# Patient Record
Sex: FEMALE | Race: WHITE | NOT HISPANIC OR LATINO | URBAN - METROPOLITAN AREA
[De-identification: names, ages, dates, MRNs, and addresses within clinical notes are randomized per-mention and may not be internally consistent; named-entity substitution may affect disease eponyms.]

---

## 2020-12-29 ENCOUNTER — EMERGENCY (EMERGENCY)
Facility: HOSPITAL | Age: 70
LOS: 1 days | Discharge: HOME | End: 2020-12-29
Attending: EMERGENCY MEDICINE
Payer: MEDICARE

## 2020-12-29 VITALS
HEART RATE: 70 BPM | RESPIRATION RATE: 18 BRPM | SYSTOLIC BLOOD PRESSURE: 172 MMHG | WEIGHT: 143.96 LBS | DIASTOLIC BLOOD PRESSURE: 77 MMHG | HEIGHT: 60 IN | OXYGEN SATURATION: 100 % | TEMPERATURE: 98 F

## 2020-12-29 VITALS
OXYGEN SATURATION: 100 % | RESPIRATION RATE: 18 BRPM | HEART RATE: 63 BPM | SYSTOLIC BLOOD PRESSURE: 160 MMHG | TEMPERATURE: 98 F | DIASTOLIC BLOOD PRESSURE: 78 MMHG

## 2020-12-29 PROCEDURE — 99283 EMERGENCY DEPT VISIT LOW MDM: CPT

## 2020-12-29 PROCEDURE — 99282 EMERGENCY DEPT VISIT SF MDM: CPT

## 2020-12-29 RX ORDER — NEBIVOLOL HYDROCHLORIDE 5 MG/1
1 TABLET ORAL
Qty: 0 | Refills: 0 | DISCHARGE

## 2020-12-29 RX ORDER — DIPHENHYDRAMINE HCL 50 MG
25 CAPSULE ORAL ONCE
Refills: 0 | Status: DISCONTINUED | OUTPATIENT
Start: 2020-12-29 | End: 2020-12-29

## 2020-12-29 RX ORDER — FLUTICASONE FUROATE, UMECLIDINIUM BROMIDE AND VILANTEROL TRIFENATATE 200; 62.5; 25 UG/1; UG/1; UG/1
1 POWDER RESPIRATORY (INHALATION)
Qty: 0 | Refills: 0 | DISCHARGE

## 2020-12-29 RX ORDER — SERTRALINE 25 MG/1
1 TABLET, FILM COATED ORAL
Qty: 0 | Refills: 0 | DISCHARGE

## 2020-12-29 RX ORDER — DIPHENHYDRAMINE HCL 50 MG
25 CAPSULE ORAL ONCE
Refills: 0 | Status: COMPLETED | OUTPATIENT
Start: 2020-12-29 | End: 2020-12-29

## 2020-12-29 RX ADMIN — Medication 25 MILLIGRAM(S): at 18:47

## 2020-12-29 NOTE — ED PROVIDER NOTE - CARE PLAN
Principal Discharge DX:	Itching  Assessment and plan of treatment:	1. Stay hydrated. avoid harsh soaps/detergents. avoid scratching.   2. Continue current home medications. Take Benadryl 25mg every 6hrs for itching as needed- don't drive after, makes drowsy.   3. Apply Aquaphor to hands - can apply at night and put gloves on hands to help from drying.   4. Follow up with your PCP in 1-2 days. Follow up with Dermatology #[phone] in 3-5 days. (Bring Printed results to your doctor visit)  5. Return if symptoms worsen, fever, weakness, spreading redness and all other concerns

## 2020-12-29 NOTE — ED ADULT NURSE NOTE - OBJECTIVE STATEMENT
C/O itchy  hand on palm of the hand  X 6 to 8 weeks   no rash noted  Pt denies  fever chills CP sob NV/D fever chills + itching no swelling noted Pt is not in pain or distress  . No other place rash is noted  Pt S/B DR Jazmin Valle . Medicated as per order

## 2020-12-29 NOTE — ED PROVIDER NOTE - PHYSICAL EXAMINATION
b/l hand with mild excoriations, no rash noted. 2-3 small papules noted at L forearm, with some excoriations, superficial. no evidence of erythema, swelling. non-tender. FROM b/l hands and wrists. no sign of infection.

## 2020-12-29 NOTE — ED PROVIDER NOTE - PLAN OF CARE
1. Stay hydrated. avoid harsh soaps/detergents. avoid scratching.   2. Continue current home medications. Take Benadryl 25mg every 6hrs for itching as needed- don't drive after, makes drowsy.   3. Apply Aquaphor to hands - can apply at night and put gloves on hands to help from drying.   4. Follow up with your PCP in 1-2 days. Follow up with Dermatology #[phone] in 3-5 days. (Bring Printed results to your doctor visit)  5. Return if symptoms worsen, fever, weakness, spreading redness and all other concerns

## 2020-12-29 NOTE — ED PROVIDER NOTE - PROGRESS NOTE DETAILS
Patient is reassessed, states feeling much better at this time. Discussed follow up and return precautions. RGUJRAL

## 2020-12-29 NOTE — ED PROVIDER NOTE - ATTENDING CONTRIBUTION TO CARE
FRANKIEUJRAL 69 yo f hx COPD and anxiety presents with itching in both hands for weeks that increased overnight. Patient RGUJRAL 69 yo f hx COPD and anxiety presents with itching in both hands for weeks that increased overnight. Patient denies any rash, new soap or medications. Itching is only localized to hands and wrist. Denies any difficulty swallowing or breathing. Denies any HA, neck/chest/abd pain. No new diets or vegetarian diet. Nml stool and urination.   On exam, Patient is awake, alert and oriented x 3.  Patient is well appearing and in no acute distress.  NCAT, PERRL, EOMI. Sclera clear  Neck is supple, No LAD.  Lungs are CTA B/L,+S1S2 no murmurs,  Abdomen:Soft nd/nt+bs no rebound or guarding.  Extremity no edema or calf tender.  B/L hands no swelling or rash. + excoriated skin due to itching and dry hands.   Skin with no rash.  Neuro CN3-12 intact. Strength 5/5 in upper and lower extremities. Nml Sensation.Gait normal.   Patient non smoker now. Pt has tried topical cortisone w no relief. Benadryl and re eval.  DDx dry hands could result in itching. Discussed hydration to hands.

## 2020-12-29 NOTE — ED PROVIDER NOTE - CROS ED SKIN ALL NEG
From: Pooja Lockhart  To: Aixa Cueva NP  Sent: 7/5/2019 9:03 AM CDT  Subject: After-Visit Question    In mid June I visited you in your office. You diagnosed me with a sinus infection and ear infections. I was placed on Doxycycline for 10 days. The problem is I am still having sporadic ear pain on the left side. I also feel some sinus pressure there, this is continuous. I am not sure if I should make an appointment and come in or if I just need more medication. I had felt I should wait a while in contacting you feeling it took time for the antibiotic to do its job but it seems the ear pain is getting closer together and I do not want to get back to where I had been before my visit. Thank you for your help!   - - -

## 2020-12-29 NOTE — ED PROVIDER NOTE - PATIENT PORTAL LINK FT
You can access the FollowMyHealth Patient Portal offered by [facility] by registering at the following website: [email or web address]. By joining [employer]’s FollowMyHealth portal, you will also be able to view your health information using other applications (apps) compatible with our system.

## 2020-12-29 NOTE — ED ADULT NURSE NOTE - NSIMPLEMENTINTERV_GEN_ALL_ED
Implemented All Universal Safety Interventions:  [place] to call system. Call bell, personal items and telephone within reach. Instruct patient to call for assistance. Room bathroom lighting operational. Non-slip footwear when patient is off stretcher. Physically safe environment: no spills, clutter or unnecessary equipment. Stretcher in lowest position, wheels locked, appropriate side rails in place.

## 2020-12-29 NOTE — ED PROVIDER NOTE - NSFOLLOWUPINSTRUCTIONS_ED_ALL_ED_FT
1. Stay hydrated. avoid harsh soaps/detergents. avoid scratching.   2. Continue current home medications. Take Benadryl 25mg every 6hrs for itching as needed- don't drive after, makes drowsy.   3. Apply Aquaphor to hands - can apply at night and put gloves on hands to help from drying.   4. Follow up with your PCP in 1-2 days. Follow up with Dermatology #[phone] in 3-5 days. (Bring Printed results to your doctor visit)  5. Return if symptoms worsen, fever, weakness, spreading redness and all other concerns

## 2021-08-17 ENCOUNTER — EMERGENCY (EMERGENCY)
Facility: HOSPITAL | Age: 71
LOS: 1 days | Discharge: HOME | End: 2021-08-17
Attending: EMERGENCY MEDICINE
Payer: MEDICARE

## 2021-08-17 VITALS
SYSTOLIC BLOOD PRESSURE: 113 MMHG | HEIGHT: 60 IN | RESPIRATION RATE: 18 BRPM | WEIGHT: 143.08 LBS | TEMPERATURE: 98 F | DIASTOLIC BLOOD PRESSURE: 73 MMHG | HEART RATE: 70 BPM | OXYGEN SATURATION: 96 %

## 2021-08-17 VITALS
RESPIRATION RATE: 18 BRPM | DIASTOLIC BLOOD PRESSURE: 75 MMHG | SYSTOLIC BLOOD PRESSURE: 178 MMHG | TEMPERATURE: 98 F | HEART RATE: 68 BPM | OXYGEN SATURATION: 98 %

## 2021-08-17 PROBLEM — R00.0 TACHYCARDIA, UNSPECIFIED: Chronic | Status: ACTIVE | Noted: 2020-12-29

## 2021-08-17 PROBLEM — J44.9 CHRONIC OBSTRUCTIVE PULMONARY DISEASE, UNSPECIFIED: Chronic | Status: ACTIVE | Noted: 2020-12-29

## 2021-08-17 LAB
ALBUMIN SERPL ELPH-MCNC: 4.4 G/DL — SIGNIFICANT CHANGE UP (ref 3.3–5)
ALP SERPL-CCNC: 94 U/L — SIGNIFICANT CHANGE UP (ref 40–120)
ALT FLD-CCNC: 15 U/L — SIGNIFICANT CHANGE UP (ref 10–45)
ANION GAP SERPL CALC-SCNC: 16 MMOL/L — SIGNIFICANT CHANGE UP (ref 5–17)
APTT BLD: 27.7 SEC — SIGNIFICANT CHANGE UP (ref 27.5–35.5)
AST SERPL-CCNC: 20 U/L — SIGNIFICANT CHANGE UP (ref 10–40)
BASOPHILS # BLD AUTO: 0.06 K/UL — SIGNIFICANT CHANGE UP (ref 0–0.2)
BASOPHILS NFR BLD AUTO: 0.7 % — SIGNIFICANT CHANGE UP (ref 0–2)
BILIRUB SERPL-MCNC: 0.3 MG/DL — SIGNIFICANT CHANGE UP (ref 0.2–1.2)
BUN SERPL-MCNC: 17 MG/DL — SIGNIFICANT CHANGE UP (ref 7–23)
CALCIUM SERPL-MCNC: 9.8 MG/DL — SIGNIFICANT CHANGE UP (ref 8.4–10.5)
CHLORIDE SERPL-SCNC: 104 MMOL/L — SIGNIFICANT CHANGE UP (ref 96–108)
CO2 BLDV-SCNC: 28 MMOL/L — HIGH (ref 22–26)
CO2 SERPL-SCNC: 22 MMOL/L — SIGNIFICANT CHANGE UP (ref 22–31)
CREAT SERPL-MCNC: 0.76 MG/DL — SIGNIFICANT CHANGE UP (ref 0.5–1.3)
EOSINOPHIL # BLD AUTO: 0.03 K/UL — SIGNIFICANT CHANGE UP (ref 0–0.5)
EOSINOPHIL NFR BLD AUTO: 0.4 % — SIGNIFICANT CHANGE UP (ref 0–6)
GLUCOSE SERPL-MCNC: 167 MG/DL — HIGH (ref 70–99)
HCO3 BLDV-SCNC: 27 MMOL/L — SIGNIFICANT CHANGE UP (ref 22–29)
HCT VFR BLD CALC: 47.1 % — HIGH (ref 34.5–45)
HGB BLD-MCNC: 15.1 G/DL — SIGNIFICANT CHANGE UP (ref 11.5–15.5)
IMM GRANULOCYTES NFR BLD AUTO: 0.6 % — SIGNIFICANT CHANGE UP (ref 0–1.5)
INR BLD: 0.97 RATIO — SIGNIFICANT CHANGE UP (ref 0.88–1.16)
LYMPHOCYTES # BLD AUTO: 1.51 K/UL — SIGNIFICANT CHANGE UP (ref 1–3.3)
LYMPHOCYTES # BLD AUTO: 18.5 % — SIGNIFICANT CHANGE UP (ref 13–44)
MCHC RBC-ENTMCNC: 29.1 PG — SIGNIFICANT CHANGE UP (ref 27–34)
MCHC RBC-ENTMCNC: 32.1 GM/DL — SIGNIFICANT CHANGE UP (ref 32–36)
MCV RBC AUTO: 90.8 FL — SIGNIFICANT CHANGE UP (ref 80–100)
MONOCYTES # BLD AUTO: 0.45 K/UL — SIGNIFICANT CHANGE UP (ref 0–0.9)
MONOCYTES NFR BLD AUTO: 5.5 % — SIGNIFICANT CHANGE UP (ref 2–14)
NEUTROPHILS # BLD AUTO: 6.07 K/UL — SIGNIFICANT CHANGE UP (ref 1.8–7.4)
NEUTROPHILS NFR BLD AUTO: 74.3 % — SIGNIFICANT CHANGE UP (ref 43–77)
NRBC # BLD: 0 /100 WBCS — SIGNIFICANT CHANGE UP (ref 0–0)
PCO2 BLDV: 46 MMHG — HIGH (ref 39–42)
PH BLDV: 7.37 — SIGNIFICANT CHANGE UP (ref 7.32–7.43)
PLATELET # BLD AUTO: 291 K/UL — SIGNIFICANT CHANGE UP (ref 150–400)
PO2 BLDV: 30 MMHG — SIGNIFICANT CHANGE UP (ref 25–45)
POTASSIUM SERPL-MCNC: 4.6 MMOL/L — SIGNIFICANT CHANGE UP (ref 3.5–5.3)
POTASSIUM SERPL-SCNC: 4.6 MMOL/L — SIGNIFICANT CHANGE UP (ref 3.5–5.3)
PROT SERPL-MCNC: 7.4 G/DL — SIGNIFICANT CHANGE UP (ref 6–8.3)
PROTHROM AB SERPL-ACNC: 11.6 SEC — SIGNIFICANT CHANGE UP (ref 10.6–13.6)
RBC # BLD: 5.19 M/UL — SIGNIFICANT CHANGE UP (ref 3.8–5.2)
RBC # FLD: 12.8 % — SIGNIFICANT CHANGE UP (ref 10.3–14.5)
SAO2 % BLDV: 52.2 % — LOW (ref 67–88)
SARS-COV-2 RNA SPEC QL NAA+PROBE: SIGNIFICANT CHANGE UP
SODIUM SERPL-SCNC: 142 MMOL/L — SIGNIFICANT CHANGE UP (ref 135–145)
TROPONIN T, HIGH SENSITIVITY RESULT: <6 NG/L — SIGNIFICANT CHANGE UP (ref 0–51)
WBC # BLD: 8.17 K/UL — SIGNIFICANT CHANGE UP (ref 3.8–10.5)
WBC # FLD AUTO: 8.17 K/UL — SIGNIFICANT CHANGE UP (ref 3.8–10.5)

## 2021-08-17 PROCEDURE — 99284 EMERGENCY DEPT VISIT MOD MDM: CPT | Mod: 25

## 2021-08-17 PROCEDURE — 93010 ELECTROCARDIOGRAM REPORT: CPT

## 2021-08-17 PROCEDURE — 93005 ELECTROCARDIOGRAM TRACING: CPT

## 2021-08-17 PROCEDURE — 82962 GLUCOSE BLOOD TEST: CPT

## 2021-08-17 PROCEDURE — U0005: CPT

## 2021-08-17 PROCEDURE — 70496 CT ANGIOGRAPHY HEAD: CPT | Mod: MA

## 2021-08-17 PROCEDURE — 85610 PROTHROMBIN TIME: CPT

## 2021-08-17 PROCEDURE — 85730 THROMBOPLASTIN TIME PARTIAL: CPT

## 2021-08-17 PROCEDURE — 80053 COMPREHEN METABOLIC PANEL: CPT

## 2021-08-17 PROCEDURE — 82803 BLOOD GASES ANY COMBINATION: CPT

## 2021-08-17 PROCEDURE — 70498 CT ANGIOGRAPHY NECK: CPT | Mod: MA

## 2021-08-17 PROCEDURE — 84484 ASSAY OF TROPONIN QUANT: CPT

## 2021-08-17 PROCEDURE — 70450 CT HEAD/BRAIN W/O DYE: CPT | Mod: MA

## 2021-08-17 PROCEDURE — 70498 CT ANGIOGRAPHY NECK: CPT | Mod: 26,MA

## 2021-08-17 PROCEDURE — 99285 EMERGENCY DEPT VISIT HI MDM: CPT

## 2021-08-17 PROCEDURE — 85025 COMPLETE CBC W/AUTO DIFF WBC: CPT

## 2021-08-17 PROCEDURE — U0003: CPT

## 2021-08-17 PROCEDURE — 70496 CT ANGIOGRAPHY HEAD: CPT | Mod: 26,MA

## 2021-08-17 RX ORDER — ATORVASTATIN CALCIUM 80 MG/1
1 TABLET, FILM COATED ORAL
Qty: 21 | Refills: 0
Start: 2021-08-17 | End: 2021-09-06

## 2021-08-17 RX ORDER — CLOPIDOGREL BISULFATE 75 MG/1
300 TABLET, FILM COATED ORAL ONCE
Refills: 0 | Status: COMPLETED | OUTPATIENT
Start: 2021-08-17 | End: 2021-08-17

## 2021-08-17 RX ORDER — CLOPIDOGREL BISULFATE 75 MG/1
1 TABLET, FILM COATED ORAL
Qty: 21 | Refills: 0
Start: 2021-08-17 | End: 2021-09-06

## 2021-08-17 RX ORDER — ATORVASTATIN CALCIUM 80 MG/1
80 TABLET, FILM COATED ORAL ONCE
Refills: 0 | Status: COMPLETED | OUTPATIENT
Start: 2021-08-17 | End: 2021-08-17

## 2021-08-17 RX ORDER — ASPIRIN/CALCIUM CARB/MAGNESIUM 324 MG
81 TABLET ORAL ONCE
Refills: 0 | Status: COMPLETED | OUTPATIENT
Start: 2021-08-17 | End: 2021-08-17

## 2021-08-17 RX ADMIN — CLOPIDOGREL BISULFATE 300 MILLIGRAM(S): 75 TABLET, FILM COATED ORAL at 17:05

## 2021-08-17 RX ADMIN — Medication 81 MILLIGRAM(S): at 17:05

## 2021-08-17 RX ADMIN — ATORVASTATIN CALCIUM 80 MILLIGRAM(S): 80 TABLET, FILM COATED ORAL at 17:05

## 2021-08-17 NOTE — STROKE CODE NOTE - DISPOSITION
Other The management and treatment decisions which include alteplase and mechanical thrombectomy were discussed with stroke fellow Dr. Radha Nicolas under supervision neurovascular attending Dr. Hola Whyte./Other

## 2021-08-17 NOTE — ED PROVIDER NOTE - PROGRESS NOTE DETAILS
neuro advised pt can have rapid outpt f/u. pt offered cdu (lives alone, was dizzy, no life alert). she declines this and will have someone stay w her today/tonight and get rx / f/u w neuro. feeling better and wants to go home, reviewed case and results w pt, questions answered and pt understands, advised return precautions and care plan. Bryant Henderson DO PGY-3: receieved as a sign out - pending neuro recs - recs reviewed. Pt did not take her asa today. Does not take plavix and any statins at home. Asxs at this time. No new complains

## 2021-08-17 NOTE — ED ADULT NURSE NOTE - OBJECTIVE STATEMENT
72 y/o female presenting to ED for blurry vision, dizziness and room spinning starting when she woke up this AM at 0615. Pt reports that last night she was normal before bed at 1015pm. Pt states that the room is spinning and "going up and down" and is associated with nausea but no vomiting. Pt has tremors at baseline, with neuro appointments for f/u. Upon exam pt A&Ox3 gross neuro intact,  no difficulty speaking in complete sentences, s1s2 heart sounds heard, pulses x 4, abreu x4, abdomen soft nontender nondistended, skin intact, pt has unsteady gait on ambulation. Pt denies chest pain, sob, ha, n/v/d, abdominal pain, f/c, urinary symptoms, hematuria.

## 2021-08-17 NOTE — CONSULT NOTE ADULT - SUBJECTIVE AND OBJECTIVE BOX
HPI: 70yo RH woman h/o tachycardia, COPD, strabismus, anxiety not on antithrombotics presenting to [facility] ED as code stroke for room-spinning dizziness/dysequilibrium with LWK 11P 8/16/21. Patient reports she woke up this morning around 6:15am and when she went to reach for her phone and had her head turned, she felt acute onset room-spinning dizziness which did not resolve with position change. She had some nauseousness and dry heaves. No associated headaches, visual disturbances, dysphagia, diarrhea, dysuria, cough/cold symptoms, hearing loss or tinnitus, head or neck trauma, loss of consciousness. She has never had this happen before. Quit smoking years ago. She reports she has chronic cough and once in a while food will go "into the wrong pipe".     (Stroke only)  NIHSS: 1  MRS: 0    REVIEW OF SYSTEMS    A 10-system ROS was performed and is negative except for those items noted above and/or in the HPI.    PAST MEDICAL & SURGICAL HISTORY:  COPD (chronic obstructive pulmonary disease)    Tachycardia      FAMILY HISTORY:    SOCIAL HISTORY:   T/E/D:   Occupation:   Lives with:     MEDICATIONS (HOME):  Home Medications:  Bystolic 5 mg oral tablet: 1 tab(s) orally once a day (29 Dec 2020 18:44)  sertraline 50 mg oral tablet: 1 tab(s) orally once a day (29 Dec 2020 18:44)  Trelegy Ellipta 100 mcg-62.5 mcg-25 mcg/inh inhalation powder: 1 puff(s) inhaled once a day (29 Dec 2020 18:44)    MEDICATIONS  (STANDING):    MEDICATIONS  (PRN):    ALLERGIES/INTOLERANCES:  Allergies  No Known Allergies    VITALS & EXAMINATION:  Vital Signs Last 24 Hrs  T(C): 36.4 (17 Aug 2021 12:00), Max: 36.4 (17 Aug 2021 12:00)  T(F): 97.5 (17 Aug 2021 12:00), Max: 97.5 (17 Aug 2021 12:00)  HR: 75 (17 Aug 2021 14:21) (70 - 81)  BP: 131/52 (17 Aug 2021 14:21) (113/73 - 173/70)  BP(mean): 75 (17 Aug 2021 14:21) (75 - 75)  RR: 18 (17 Aug 2021 14:21) (18 - 18)  SpO2: 98% (17 Aug 2021 14:21) (96% - 98%)    Neurological (>12):  MS: eyes open, alert, oriented to person, place, situation, time. Normal affect. Follows all commands.    Language: Speech is mildly dysarthric (though patient reports she is at baseline), fluent with good repetition & comprehension (able to name objects___)    CNs: PERRL (R = 3mm, L = 3mm). VFF. EOM demonstrates torsional nystagmus upon right gaze and left-beating nystagmus on left gaze, V1-3 intact to LT, well developed masseter muscles b/l. No facial asymmetry b/l, full eye closure strength b/l. Hearing grossly normal (rubbing fingers) b/l. Symmetric palate elevation in midline. Gag reflex deferred. Head turning & shoulder shrug intact b/l. Tongue midline, normal movements, no atrophy.    HINTS: no corrective saccade upon head impulse test, nystagmus as above, negative test of skew  Paterson-Hallpike: negative      Motor: Normal muscle bulk & tone. No noticeable tremor. No pronator drift.              Deltoid	Biceps	Triceps	Wrist	Finger ABd	   R	5	5	5	5	5		5 	  L	5	5	5	5	5		5    	H-Flex	H-Ext	H-ABd	H-ADd	K-Flex	K-Ext	D-Flex	P-Flex  R	5	5	5	5	5	5	5	5 	   L	5	5	5	5	5	5	5	5	     Sensation: Intact to LT b/l throughout.     Cortical: Extinction on DSS (neglect): none    Coordination: intact rapid-alt movements. No dysmetria to FTN/HTS    Gait: No postural instability. Normal stance and tandem gait.     LABORATORY:  CBC                       15.1   8.17  )-----------( 291      ( 17 Aug 2021 12:18 )             47.1     Chem 08-17    142  |  104  |  17  ----------------------------<  167<H>  4.6   |  22  |  0.76    Ca    9.8      17 Aug 2021 12:18    TPro  7.4  /  Alb  4.4  /  TBili  0.3  /  DBili  x   /  AST  20  /  ALT  15  /  AlkPhos  94  08-17    LFTs LIVER FUNCTIONS - ( 17 Aug 2021 12:18 )  Alb: 4.4 g/dL / Pro: 7.4 g/dL / ALK PHOS: 94 U/L / ALT: 15 U/L / AST: 20 U/L / GGT: x           Coagulopathy PT/INR - ( 17 Aug 2021 12:18 )   PT: 11.6 sec;   INR: 0.97 ratio         PTT - ( 17 Aug 2021 12:18 )  PTT:27.7 sec    STUDIES & IMAGING:  Studies (EKG, EEG, EMG, etc):     Radiology (XR, CT, MR, U/S, TTE/ROXIE):     CT Head noncon, CT Angio Head & Neck w/ IV Cont (08.17.21 @ 12:24)     IMPRESSION:    HEAD CT: No acute intracranial hemorrhage or acute territorial infarction.    If symptoms persist consider follow up head CT or MRI, MRA  if no contraindication.    CTA COW:  Patent intracranial circulation without flow limiting stenosis or large vessel occlusion.    CTA NECK: Patent, ECAs, ICAs, no  hemodynamically significant stenosis at  ICA origins by NASCET criteria.  Bilateral vertebral arteries are patent without flow limiting stenosis.

## 2021-08-17 NOTE — ED PROVIDER NOTE - CARE PROVIDER_API CALL
Hola Whyte)  Neurology; Vascular Neurology  [address], [address]  [place] [address]  Phone: [phone]  Fax: [phone]  Follow Up Time: 4-6 Days

## 2021-08-17 NOTE — CONSULT NOTE ADULT - ASSESSMENT
Assessment: 72yo RH woman h/o tachycardia, COPD, strabismus, anxiety not on antithrombotics presenting to [facility] ED as code stroke for room-spinning dizziness/dysequilibrium with LWK 11P 21. Neurological examination demonstrates right torsional nystagmus and left-beating nystagmus. CTH/CTA negative. Patient out of window for tpa and symptoms resolved. No radiographic evidence of LVO so not a candidate for mechanical thrombectomy.    Impression: acute vestibular syndrome with negative karla hallpike and direction changing nystagmus 2/2 posterior fossa infarct 2/2 likely     Recommendations:  [ ] no further neurological workup inpatient  [ ] as outpatient may get MRI head noncon  [ ] ABCD2 5 - moderate risk of stroke  [ ] aspirin 81mg daily indefinitely  [ ] load with plavix 300mg x 1  [ ] plavix 75mg daily for 21 days per CHANCE trial  [ ] atorvastatin 80mg daily to titrate for LDL < 70  [ ] a1c, lipid profile  [ ] upon discharge pt may follow up with Dr. Whyte as outpatient  [phone]    case d/w Stroke Fellow Dr. Radha Nicolas and Neurovascular Attending Dr. Hola Whyte

## 2021-08-17 NOTE — ED PROVIDER NOTE - PHYSICAL EXAMINATION
Physical Exam:  Gen: NAD, non-toxic appearing, demonstrates difficulty ambulating to CT scan   Head: NCAT  HEENT: EOMI, PEERL, normal conjunctiva, tongue midline, oral mucosa moist  Lung: CTAB, no respiratory distress, no wheezes/rhonchi/rales B/L, speaking in full sentences  CV: RRR, no murmurs, rubs or gallops  Abd: soft, NT, ND  MSK: no visible deformities, ROM normal in UE/LE, no back pain  Neuro: L amblyopia, CN2-12 grossly intact, A&Ox4, MS +5/5 in UE and LE BL, finger to nose smooth and rapid, gross sensation intact in UE and LE BL, ambulates , negative rhomberg, negative pronator drift  Skin: Warm, well perfused, no rash, no leg swelling  Psych: normal affect, calm  Shanique Bhardwaj D.O. -Resident

## 2021-08-17 NOTE — ED PROVIDER NOTE - NS ED ROS FT
CONSTITUTIONAL: No fevers, no chills, + dizziness  EYES: no acute visual changes, no eye pain  EARS: no ear drainage, no ear pain, no change in hearing  NOSE: no nasal congestion  MOUTH/THROAT: no sore throat  CV: No chest pain, no palpitations  RESP: No SOB, no cough  GI: +nausea, no v/d, no abd pain  : no dysuria, no hematuria, no flank pain  MSK: no back pain, no extremity pain  SKIN: no rashes  NEURO: no headache, no focal weakness, no decreased sensation/paresthesias   PSYCHIATRIC: no known mental health issues

## 2021-08-17 NOTE — ED PROVIDER NOTE - PATIENT PORTAL LINK FT
You can access the FollowMyHealth Patient Portal offered by [facility] by registering at the following website: [email or web address]. By joining [employer]’s FollowMyHealth portal, you will also be able to view your health information using other applications (apps) compatible with our system.

## 2021-08-17 NOTE — ED PROVIDER NOTE - ATTENDING CONTRIBUTION TO CARE
dizziness, last nl last night. unable to walk. code stroke at triage  poor finger to nose NIHSS - see code stroke note  ct/cta/labs

## 2021-08-17 NOTE — ED ADULT NURSE NOTE - CHPI ED NUR SYMPTOMS NEG
no change in level of consciousness/no confusion/no fever/no loss of consciousness/no numbness/no vomiting/no weakness

## 2021-08-17 NOTE — ED PROVIDER NOTE - OBJECTIVE STATEMENT
71y F w/ PMHx pre-diabetes, COPD presenting with dizziness and difficulty walking since waking up this morning at 6:15am. States she went to bed at 11pm at normal state of health. When she awoke this morning states the room was moving "up and down" and states she felt nauseous and was dry heaving. States she was having trouble walking this morning without syncope. States these symptoms have not happened in the past. Endorses en route to ED her dizziness has subsided but still endorsing severe nausea. Patient denies recent fever, chills, chest pain, tinnitus, sob, abd pain, dysuria, hematuria, rash, focal weakness or numbness. States she takes a daily ASA 81mg.

## 2021-08-17 NOTE — ED PROVIDER NOTE - NSFOLLOWUPINSTRUCTIONS_ED_ALL_ED_FT
You were seen for dizziness and difficulty walking. You're at moderate risk of having a stroke. Please take aspirin, plavix and atorvastatin as discussed. Follow up with your primary care physician for refill of your medications and continue care including sending hemoglobin A1c and lipid panel.     Follow up with Dr. Whyte as outpatient - call at  to schedule an appointment within 1 week.     If you start developing chest pain, shortness of breath, facial drool, slurrying of your speech, change in strength or sensation in your extremities or any other concerning symptoms, please seek medical assistance right away.     Your results are attached with your discharge instructions, please review them with your primary care physician. If there is a result pending, you will receive a call if test is positive.    A presumptive diagnosis is made today, but further evaluation may be required by your primary care doctor and/or specialist for a definitive diagnosis. Therefore, follow up as directed and if symptoms change/worsen or any emergency conditions, please return to the ER.    For pain or fever you can ibuprofen (motrin, advil) or tylenol as needed, as directed on packaging.    If needed, call patient access services at [phone] to find a primary care doctor, or call at [phone] to make an appointment at the clinic.

## 2021-08-17 NOTE — ED PROVIDER NOTE - CLINICAL SUMMARY MEDICAL DECISION MAKING FREE TEXT BOX
71y F w/ PMHx pre-diabetes, COPD presenting with dizziness and difficulty walking since waking up this morning at 6:15am. Arrived to ED as code stroke, last known well 11pm. VS WNL, patient still endorsing mild dizziness and difficulty ambulating as well as nausea. L amblyopia, unsteady gait, otherwise neuro intact. Differential includes vertigo vs. aneurysm, lower suspicion for ischemic or hemorrhagic stroke. CTA head/cervical spine, CT head, labs, EKG, treat nausea and reassess.

## 2022-10-20 NOTE — ED ADULT NURSE NOTE - CHIEF COMPLAINT QUOTE
----- Message from Missy Damon MD sent at 10/20/2022  7:43 AM CDT -----  Lab work reviewed.   Urine microalb is not elevated.   A1c is at 9%  Vit D, magnesium, phosphorus all within reference range.   PTH slightly above range.   her normalized ionized calcium (which is free calcium has been minimally suppressed at 12.13 (ref range 1.15 to 1.29) and serum calcium corrected for albumin is normal.    redness and itchiness to L wrist and bilateral palms, denies any new soaps

## 2023-04-25 ENCOUNTER — NON-APPOINTMENT (OUTPATIENT)
Age: 73
End: 2023-04-25

## 2023-05-02 ENCOUNTER — APPOINTMENT (OUTPATIENT)
Dept: OTOLARYNGOLOGY | Facility: CLINIC | Age: 73
End: 2023-05-02
Payer: MEDICARE

## 2023-05-02 VITALS
DIASTOLIC BLOOD PRESSURE: 69 MMHG | HEIGHT: 60 IN | BODY MASS INDEX: 28.47 KG/M2 | WEIGHT: 145 LBS | SYSTOLIC BLOOD PRESSURE: 119 MMHG | HEART RATE: 78 BPM

## 2023-05-02 DIAGNOSIS — Z85.3 PERSONAL HISTORY OF MALIGNANT NEOPLASM OF BREAST: ICD-10-CM

## 2023-05-02 DIAGNOSIS — Z92.3 PERSONAL HISTORY OF IRRADIATION: ICD-10-CM

## 2023-05-02 DIAGNOSIS — R09.89 OTHER SPECIFIED SYMPTOMS AND SIGNS INVOLVING THE CIRCULATORY AND RESPIRATORY SYSTEMS: ICD-10-CM

## 2023-05-02 DIAGNOSIS — R13.14 DYSPHAGIA, PHARYNGOESOPHAGEAL PHASE: ICD-10-CM

## 2023-05-02 DIAGNOSIS — Z87.891 PERSONAL HISTORY OF NICOTINE DEPENDENCE: ICD-10-CM

## 2023-05-02 DIAGNOSIS — J44.9 CHRONIC OBSTRUCTIVE PULMONARY DISEASE, UNSPECIFIED: ICD-10-CM

## 2023-05-02 DIAGNOSIS — Z80.49 FAMILY HISTORY OF MALIGNANT NEOPLASM OF OTHER GENITAL ORGANS: ICD-10-CM

## 2023-05-02 DIAGNOSIS — R73.03 PREDIABETES.: ICD-10-CM

## 2023-05-02 DIAGNOSIS — H61.21 IMPACTED CERUMEN, RIGHT EAR: ICD-10-CM

## 2023-05-02 PROBLEM — Z00.00 ENCOUNTER FOR PREVENTIVE HEALTH EXAMINATION: Status: ACTIVE | Noted: 2023-05-02

## 2023-05-02 PROCEDURE — 31575 DIAGNOSTIC LARYNGOSCOPY: CPT

## 2023-05-02 PROCEDURE — 99204 OFFICE O/P NEW MOD 45 MIN: CPT | Mod: 25

## 2023-05-02 PROCEDURE — 69210 REMOVE IMPACTED EAR WAX UNI: CPT

## 2023-05-02 RX ORDER — ENALAPRIL MALEATE 5 MG/1
TABLET ORAL
Refills: 0 | Status: ACTIVE | COMMUNITY

## 2023-05-02 RX ORDER — ALENDRONATE SODIUM 35 MG/1
TABLET ORAL
Refills: 0 | Status: ACTIVE | COMMUNITY

## 2023-05-02 RX ORDER — MAGNESIUM GLUCONATE 27 MG(500)
500 (27 MG) TABLET ORAL
Refills: 0 | Status: ACTIVE | COMMUNITY

## 2023-05-02 RX ORDER — BUDESONIDE, GLYCOPYRROLATE, AND FORMOTEROL FUMARATE 160; 9; 4.8 UG/1; UG/1; UG/1
AEROSOL, METERED RESPIRATORY (INHALATION)
Refills: 0 | Status: ACTIVE | COMMUNITY

## 2023-05-02 RX ORDER — ATORVASTATIN CALCIUM 80 MG/1
TABLET, FILM COATED ORAL
Refills: 0 | Status: ACTIVE | COMMUNITY

## 2023-05-02 RX ORDER — PROPRANOLOL HYDROCHLORIDE 80 MG/1
TABLET ORAL
Refills: 0 | Status: ACTIVE | COMMUNITY

## 2023-05-02 RX ORDER — VENLAFAXINE HYDROCHLORIDE 75 MG/1
75 CAPSULE, EXTENDED RELEASE ORAL
Refills: 0 | Status: ACTIVE | COMMUNITY

## 2023-05-02 NOTE — PHYSICAL EXAM
[Midline] : trachea located in midline position [Normal] : no rashes [Laryngoscopy Performed] : laryngoscopy was performed, see procedure section for findings [FreeTextEntry1] : Overweight [de-identified] : CI on R.  Removed.

## 2023-05-02 NOTE — ASSESSMENT
[FreeTextEntry1] : No significant findings in H & N region.\par \par Pt may have mild LPR.  \par \par Swallow eval with MBS recommended.

## 2023-05-02 NOTE — PROCEDURE
[Topical Lidocaine] : topical lidocaine [Oxymetazoline HCl] : oxymetazoline HCl [Flexible Endoscope] : examined with the flexible endoscope [Unable to Cooperate with Mirror] : patient unable to cooperate with mirror [Dysphagia] : dysphagia not clearly evaluated by indirect laryngoscopy [Serial Number: ___] : Serial Number: [unfilled] [Normal] : the false vocal folds were pink and regular, the ventricular sulcus was open, the true vocal folds were glistening white, tense and of equal length, mobility, and height [True Vocal Cords Paralysis] : no true vocal cord paralysis [True Vocal Cords Erythematous] : no true vocal cord edema [True Vocal Cords Cohen's Nodules] : no true vocal cord nodules [Glottis Arytenoid Cartilages] : no arytenoid granulomas [Glottis Arytenoid Cartilages Erythema] : no arytenoid erythema [Interarytenoid Edema] : interarytenoid area edematous [de-identified] : Surgilube

## 2023-05-02 NOTE — HISTORY OF PRESENT ILLNESS
[de-identified] : 73F presents for trouble swallowing liquids. Patient feels like she has been "choking" on liquids for the last 7-8 months. Patient has intermittent hoarseness, feels like she has to constantly clear her throat. \par Patient says endoscopy in 2020 revealed no issues\par Denies difficulty with solids. Patient denies odynophagia, dyspnea, heartburn, cough, globus, or otalgia. \par Denies use of over the counter antacids or reflux medications.\par Former cigarette smoker, smoked 1ppd 20? years, quit >35 years ago\par Occasional alcohol use. \par \par

## 2023-05-02 NOTE — CONSULT LETTER
[Dear  ___] : Dear  [unfilled], [Please see my note below.] : Please see my note below. [Sincerely,] : Sincerely, [Consult Letter:] : I had the pleasure of evaluating your patient, [unfilled]. [FreeTextEntry2] : Edgar Albrecht M.D [FreeTextEntry3] : Myra Helm PA-C \par Department of Otolaryngology\par [facility]\par Otolaryngology at [place]\par [address] [address]\par \par \par Luis Salazar MD, FACS \par Chief of Otolaryngology at [facility] \par  \par Dept. of Otolaryngology \par [facility]

## 2023-05-05 ENCOUNTER — NON-APPOINTMENT (OUTPATIENT)
Age: 73
End: 2023-05-05

## 2023-05-21 ENCOUNTER — TRANSCRIPTION ENCOUNTER (OUTPATIENT)
Age: 73
End: 2023-05-21

## 2023-05-31 ENCOUNTER — APPOINTMENT (OUTPATIENT)
Dept: OTOLARYNGOLOGY | Facility: CLINIC | Age: 73
End: 2023-05-31
Payer: MEDICARE

## 2023-05-31 PROCEDURE — 92612 ENDOSCOPY SWALLOW (FEES) VID: CPT | Mod: GN

## 2023-06-07 ENCOUNTER — APPOINTMENT (OUTPATIENT)
Dept: SPEECH THERAPY | Facility: HOSPITAL | Age: 73
End: 2023-06-07
Payer: MEDICARE

## 2023-06-07 ENCOUNTER — OUTPATIENT (OUTPATIENT)
Dept: OUTPATIENT SERVICES | Facility: HOSPITAL | Age: 73
LOS: 1 days | End: 2023-06-07

## 2023-06-07 ENCOUNTER — OUTPATIENT (OUTPATIENT)
Dept: OUTPATIENT SERVICES | Facility: HOSPITAL | Age: 73
LOS: 1 days | Discharge: HOME | End: 2023-06-07

## 2023-06-07 ENCOUNTER — APPOINTMENT (OUTPATIENT)
Dept: RADIOLOGY | Facility: HOSPITAL | Age: 73
End: 2023-06-07
Payer: MEDICARE

## 2023-06-07 ENCOUNTER — NON-APPOINTMENT (OUTPATIENT)
Age: 73
End: 2023-06-07

## 2023-06-07 DIAGNOSIS — R13.14 DYSPHAGIA, PHARYNGOESOPHAGEAL PHASE: ICD-10-CM

## 2023-06-07 PROCEDURE — 74230 X-RAY XM SWLNG FUNCJ C+: CPT | Mod: 26

## 2023-06-07 NOTE — ASSESSMENT
[FreeTextEntry1] : MODIFIED BARIUM SWALLOW STUDY \par \par Date of Report: 6/7/23 \par Date of Evaluation: 6/7/23 \par Patient Name: Cony Manuel \par YOB: 1950 \par Primary Diagnosis: OroPharyngeal Dysphagia \par Treatment Diagnosis: OroPharyngeal Dysphagia \par Referring Physician: Dr. Luis Salazar \par \par Reason For Referral: This 73 year old female was seen for a Modified Barium Swallow to: rule out aspiration and assess for safest diet consistency, to determine patient's ability to safely tolerate an oral diet. The physician ordered this procedure because they want the patient to meet their nutrition/hydration needs by mouth without compromising respiratory status. \par \par Patient arrived to today’s evaluation unaccompanied. Patient states she noticed around “the holidays” she had difficulty with liquids characterized by “coughing when drinking water”. She further noticed “hoarseness”. Patient denies difficulty with solid foods. Patient further denies recent/past pneumonias and/or receiving the Heimlich Maneuver. Patient states she had Flexible Endoscopic Evaluation Swallow Study done on 5/30 with no significant findings.  \par \par Medical History: Patient presents with medical history of the following per EMR: \par Active Problems\par Pharyngoesophageal dysphagia (787.24) (R13.14)\par Choking episode (784.99) (R09.89)\par COPD (chronic obstructive pulmonary disease) (496) (J44.9)\par Prediabetes (790.29) (R73.03)\par \par Past Medical History\par History of malignant neoplasm of breast (V10.3) (Z85.3)\par History of radiation therapy (V15.3) (Z92.3)\par \par Surgical History\par History of Lumpectomy\par  \par ASSESSMENT \par The patient was assessed standing in the lateral plane in the [facility], with Radiologist present. The patient was alert, cooperative.  \par \par Consistencies Administered: Puree, regular solids, and Thin liquids.  \par \par SUMMARY & IMPRESSION \par \par The patient demonstrated the following: \par 1. Functional oral stage for puree, regular solids and thin liquids marked by adequate oral acceptance, chewing for regular solids and tongue motion to piecemeal (2-3 swallows for regular solids) anterior to posterior transport. Adequate oral clearance post primary swallow.  \par 2. Functional pharyngeal stage for puree, regular solids, and thin liquids marked by an adequate pharyngeal swallow trigger with adequate base of tongue retraction, adequate hyolaryngeal elevation, reduced epiglottis deflection, adequate pharyngeal contractility and adequate closure of the laryngeal vestibule. Adequate pharyngeal clearance post primary swallow. No Aspiration observed before, during or after the swallow for puree, regular solids and/or thin liquids.  \par \par An Esophageal Screen was completed. The patient was turned to AP view and given a Barium Tablet with a cup of thin liquids. The tablet was noted to course through the esophagus without hold up. This is not a full/complete evaluation of the esophagus. \par \par Aspiration - Penetration Scale: \par PUREE: 1 \par REGULAR SOLIDS: 1 \par THIN LIQUIDS: 1 \par \par Aspiration - Penetration Scale (Vinniek et al Dysphagia 11:93-98 (April 1996), Aspiration-Penetration Scale) \par 1. Material does not enter the airway \par 2. Material enters the airway, remains above the vocal folds, and is ejected from the airway \par 3. Material enters the airway, remains above the vocal folds, and is not ejected \par 4. Material enters the airway, contacts the vocal folds, and is ejected from the airway \par 5. Material enters the airway, contacts the vocal folds, and is not ejected from the airway \par 6. Material enters the airway, passes below the vocal folds and is ejected into the larynx or out of the airway \par 7. Material enters the airway, passes below the vocal folds, and is not ejected from the trachea despite effort \par 8. Material enters the airway, passes below the vocal folds, and no effort is made to eject \par \par Recommendations: \par 1) Regular solids with thin liquids \par 2) Feeding/Swallowing Guidelines: Upright positioning, Small single sips of thin liquids, maintain good oral hygiene \par 3) Aspiration/reflux precautions \par 4) Consider Voice evaluation/therapy at MD’s discretion given patient reporting “hoarseness” \par 5) Follow up with referring physician \par \par The above results and recommendations have been discussed with the patient. All questions were answered with patient and demonstrating good understanding. \par \par Should you have any additional concerns, please contact the Center at [phone]. \par  \par Aileen Dwyer MS, CCC-SLP \par Speech-Language Pathologist \par [facility]

## 2023-06-08 ENCOUNTER — TRANSCRIPTION ENCOUNTER (OUTPATIENT)
Age: 73
End: 2023-06-08

## 2023-06-09 DIAGNOSIS — R13.10 DYSPHAGIA, UNSPECIFIED: ICD-10-CM

## 2023-10-01 PROBLEM — Z92.3 HISTORY OF RADIATION THERAPY: Status: RESOLVED | Noted: 2023-05-02 | Resolved: 2023-10-01

## 2024-01-01 NOTE — ED PROVIDER NOTE - OBJECTIVE STATEMENT
71 y/o F h/o tachycardia, COPD, anxiety with c/o b/l hand "itching" that started with just involving distal L forearm area for several weeks that then spread to b/l hands overnight. pt states extreme itching that concerned her to come in. pt otherwise feels well. increased washing of hands. no new soaps  denies fever, n/v, weakness, abdominal pain, malaise. 2024

## 2025-07-21 ENCOUNTER — NON-APPOINTMENT (OUTPATIENT)
Age: 75
End: 2025-07-21

## 2025-07-21 ENCOUNTER — APPOINTMENT (OUTPATIENT)
Dept: GASTROENTEROLOGY | Facility: CLINIC | Age: 75
End: 2025-07-21
Payer: MEDICARE

## 2025-07-21 DIAGNOSIS — D50.9 IRON DEFICIENCY ANEMIA, UNSPECIFIED: ICD-10-CM

## 2025-07-21 DIAGNOSIS — R19.4 CHANGE IN BOWEL HABIT: ICD-10-CM

## 2025-07-21 PROCEDURE — 99204 OFFICE O/P NEW MOD 45 MIN: CPT

## 2025-07-21 RX ORDER — METFORMIN HYDROCHLORIDE 500 MG/1
500 TABLET, COATED ORAL
Qty: 60 | Refills: 0 | Status: ACTIVE | COMMUNITY
Start: 2025-07-21

## 2025-07-21 RX ORDER — OLMESARTAN MEDOXOMIL 5 MG/1
5 TABLET, FILM COATED ORAL
Refills: 0 | Status: ACTIVE | COMMUNITY
Start: 2025-07-21

## 2025-07-21 RX ORDER — POLYETHYLENE GLYCOL 3350 AND ELECTROLYTES WITH LEMON FLAVOR 236; 22.74; 6.74; 5.86; 2.97 G/4L; G/4L; G/4L; G/4L; G/4L
236 POWDER, FOR SOLUTION ORAL
Qty: 1 | Refills: 0 | Status: ACTIVE | COMMUNITY
Start: 2025-07-21 | End: 1900-01-01

## 2025-07-21 RX ORDER — ATORVASTATIN CALCIUM 40 MG/1
40 TABLET, FILM COATED ORAL
Refills: 0 | Status: ACTIVE | COMMUNITY
Start: 2025-07-21

## 2025-07-21 RX ORDER — PROPRANOLOL HYDROCHLORIDE 20 MG/1
20 TABLET ORAL
Refills: 0 | Status: ACTIVE | COMMUNITY
Start: 2025-07-21

## 2025-08-19 ENCOUNTER — RESULT REVIEW (OUTPATIENT)
Age: 75
End: 2025-08-19

## 2025-08-19 ENCOUNTER — APPOINTMENT (OUTPATIENT)
Dept: GASTROENTEROLOGY | Facility: HOSPITAL | Age: 75
End: 2025-08-19

## 2025-08-19 ENCOUNTER — TRANSCRIPTION ENCOUNTER (OUTPATIENT)
Age: 75
End: 2025-08-19

## 2025-08-19 ENCOUNTER — OUTPATIENT (OUTPATIENT)
Dept: OUTPATIENT SERVICES | Facility: HOSPITAL | Age: 75
LOS: 1 days | End: 2025-08-19
Payer: MEDICARE

## 2025-08-19 VITALS
TEMPERATURE: 98 F | SYSTOLIC BLOOD PRESSURE: 178 MMHG | HEIGHT: 60 IN | DIASTOLIC BLOOD PRESSURE: 74 MMHG | WEIGHT: 134.92 LBS | HEART RATE: 86 BPM | OXYGEN SATURATION: 95 % | RESPIRATION RATE: 23 BRPM

## 2025-08-19 VITALS
HEART RATE: 74 BPM | DIASTOLIC BLOOD PRESSURE: 71 MMHG | RESPIRATION RATE: 20 BRPM | OXYGEN SATURATION: 97 % | SYSTOLIC BLOOD PRESSURE: 161 MMHG

## 2025-08-19 DIAGNOSIS — C50.919 MALIGNANT NEOPLASM OF UNSPECIFIED SITE OF UNSPECIFIED FEMALE BREAST: Chronic | ICD-10-CM

## 2025-08-19 DIAGNOSIS — D50.9 IRON DEFICIENCY ANEMIA, UNSPECIFIED: ICD-10-CM

## 2025-08-19 LAB — GLUCOSE BLDC GLUCOMTR-MCNC: 95 MG/DL — SIGNIFICANT CHANGE UP (ref 70–99)

## 2025-08-19 PROCEDURE — 82962 GLUCOSE BLOOD TEST: CPT

## 2025-08-19 PROCEDURE — 43239 EGD BIOPSY SINGLE/MULTIPLE: CPT | Mod: GC

## 2025-08-19 PROCEDURE — 88305 TISSUE EXAM BY PATHOLOGIST: CPT

## 2025-08-19 PROCEDURE — 43239 EGD BIOPSY SINGLE/MULTIPLE: CPT

## 2025-08-19 PROCEDURE — 45385 COLONOSCOPY W/LESION REMOVAL: CPT | Mod: GC

## 2025-08-19 PROCEDURE — 45385 COLONOSCOPY W/LESION REMOVAL: CPT

## 2025-08-19 PROCEDURE — 88305 TISSUE EXAM BY PATHOLOGIST: CPT | Mod: 26

## 2025-08-19 DEVICE — NET RETRV ROT ROTH 2.5MMX230CM: Type: IMPLANTABLE DEVICE | Status: FUNCTIONAL

## 2025-08-19 RX ORDER — METFORMIN HYDROCHLORIDE 850 MG/1
1 TABLET ORAL
Refills: 0 | DISCHARGE

## 2025-08-19 RX ORDER — PROPRANOLOL HCL 60 MG
1 TABLET ORAL
Refills: 0 | DISCHARGE

## 2025-08-19 RX ORDER — ENALAPRIL MALEATE 20 MG
0 TABLET ORAL
Refills: 0 | DISCHARGE

## 2025-08-19 RX ORDER — METFORMIN HYDROCHLORIDE 850 MG/1
0 TABLET ORAL
Refills: 0 | DISCHARGE

## 2025-08-19 RX ORDER — ASPIRIN 325 MG
0 TABLET ORAL
Refills: 0 | DISCHARGE

## 2025-08-19 RX ORDER — VENLAFAXINE HYDROCHLORIDE 37.5 MG/1
1 CAPSULE, EXTENDED RELEASE ORAL
Refills: 0 | DISCHARGE

## 2025-08-22 ENCOUNTER — NON-APPOINTMENT (OUTPATIENT)
Age: 75
End: 2025-08-22

## 2025-08-22 LAB — SURGICAL PATHOLOGY STUDY: SIGNIFICANT CHANGE UP

## (undated) DEVICE — CLAMP BX HOT RAD JAW 3

## (undated) DEVICE — SENSOR O2 FINGER ADULT

## (undated) DEVICE — IRRIGATOR BIO SHIELD

## (undated) DEVICE — ELCTR GROUNDING PAD ADULT COVIDIEN

## (undated) DEVICE — SYR ALLIANCE II INFLATION 60ML

## (undated) DEVICE — POLY TRAP ETRAP

## (undated) DEVICE — BIOPSY FORCEP RADIAL JAW 4 STANDARD WITH NEEDLE

## (undated) DEVICE — TUBING SUCTION CONN 6FT STERILE

## (undated) DEVICE — SUCTION YANKAUER NO CONTROL VENT

## (undated) DEVICE — CATH IV SAFE BC 22G X 1" (BLUE)

## (undated) DEVICE — TUBING IV SET GRAVITY 3Y 100" MACRO

## (undated) DEVICE — CATH IV SAFE BC 20G X 1.16" (PINK)

## (undated) DEVICE — SYR LUER LOK 50CC

## (undated) DEVICE — FORCEP RADIAL JAW 4 JUMBO 2.8MM 3.2MM 240CM ORANGE DISP

## (undated) DEVICE — PACK IV START WITH CHG

## (undated) DEVICE — BRUSH COLONOSCOPY CYTOLOGY

## (undated) DEVICE — TUBING SUCTION 20FT

## (undated) DEVICE — FOLEY HOLDER STATLOCK 2 WAY ADULT

## (undated) DEVICE — SOL INJ NS 0.9% 500ML 2 PORT

## (undated) DEVICE — BALLOON US ENDO

## (undated) DEVICE — SNARE LESIONHUNTER ROTAT NITNL COLD 15MM

## (undated) DEVICE — BITE BLOCK ADULT 20 X 27MM (GREEN)

## (undated) DEVICE — ENDOCUFF VISION SZ 3 SM PRPL